# Patient Record
(demographics unavailable — no encounter records)

---

## 2024-12-16 NOTE — PHYSICAL EXAM
[Chaperone Present] : A chaperone was present in the examining room during all aspects of the physical examination [Appropriately responsive] : appropriately responsive [Alert] : alert [No Acute Distress] : no acute distress [No Lymphadenopathy] : no lymphadenopathy [Soft] : soft [Non-tender] : non-tender [Non-distended] : non-distended [No HSM] : No HSM [No Lesions] : no lesions [No Mass] : no mass [Oriented x3] : oriented x3 [Examination Of The Breasts] : a normal appearance [No Discharge] : no discharge [No Masses] : no breast masses were palpable [Labia Majora] : normal [Labia Minora] : normal [No Bleeding] : There was no active vaginal bleeding [Normal] : normal [Retroversion] : retroverted [Uterine Adnexae] : normal

## 2024-12-16 NOTE — REASON FOR VISIT
[Annual] : an annual visit. [Dysmenorrhea] : dysmenorrhea [Abnormal Uterine Bleeding] : abnormal uterine bleeding

## 2024-12-16 NOTE — COUNSELING
[Nutrition/ Exercise/ Weight Management] : nutrition, exercise, weight management [Vitamins/Supplements] : vitamins/supplements [Breast Self Exam] : breast self exam [Bladder Hygiene] : bladder hygiene [Contraception/ Emergency Contraception/ Safe Sexual Practices] : contraception, emergency contraception, safe sexual practices [STD (testing, results, tx)] : STD (testing, results, tx) [Medication Management] : medication management [No] : Not willing to quit smoking

## 2024-12-16 NOTE — HISTORY OF PRESENT ILLNESS
[Abnormal Quantity] : abnormal quantity [___ Days] : [unfilled] days [Currently Active] : currently active [No] : No [Heavy Bleeding] : heavy bleeding [FreeTextEntry1] : 25 yo  LMP 24 presents for annual c/o very heavy painful menses

## 2024-12-16 NOTE — REVIEW OF SYSTEMS
[Negative] : Heme/Lymph [Abn Vaginal bleeding] : abnormal vaginal bleeding [Pelvic pain] : pelvic pain [Genital Rash/Irritation] : genital rash/irritation

## 2025-01-26 NOTE — HISTORY OF PRESENT ILLNESS
[FreeTextEntry1] : 25 yo presents foor evaluation of contraception and vaginal discharge.  Pt currently not sexually active.  Pt c/o of vaginal discharge.

## 2025-01-26 NOTE — DISCUSSION/SUMMARY
[FreeTextEntry1] : discussed with patient HPV+.  Patient reassured that most HPV infections are transient, however some can remain active.  Pt understands the need for co-testing surveillance. I spent the time noted on the day of this patient encounter preparing for, providing and documenting the above service. I have counseled and educated the patient on the differential, workup, disease course, and treatment/management plan. Education was provided to the patient during this encounter. All questions and concerns were answered and addressed in detail.

## 2025-01-26 NOTE — PHYSICAL EXAM
[Chaperone Present] : A chaperone was present in the examining room during all aspects of the physical examination [Soft] : soft [Non-tender] : non-tender [Non-distended] : non-distended [Oriented x3] : oriented x3 [Labia Majora] : normal [Labia Minora] : normal [Normal] : normal [Uterine Adnexae] : normal

## 2025-03-05 NOTE — ASSESSMENT
[FreeTextEntry1] : This is a case of a 26 yr right handed female with PMH of substance abuse, ADHD, presents today with headaches.  will avoid triptans for pt substance abuse history and psy history  Plan:     {      } trial ubrelvy 100 mg     {      } MRI brain- worsening ha    {      } start mag 400 mg and b2 400 mg    {      } continue working with Psy and therapist    {      } F/u 3 months   Headache education provided: [] Stay well hydrated [] Limit excessive caffeine and alcohol intake [] Maintain good sleep hygiene [] Try to avoid triggers [] Practice good eating habits [] Avoid excessive use of over the counter pain medications, as they can cause medication overuse headaches  [] Keep a headache diary [] Relaxation techniques, biofeedback, massage therapy, acupunctures, and heating pads may be effective  The above plan was discussed with Cleo Oseguera in great detail. Cleo Oseguera verbalized understanding and agrees with plan as detailed above. Patient was provided education and counselling on current diagnosis/symptoms. She was advised to call our clinic at [phone] for any new or worsening symptoms, or with any questions or concerns. In case of acute onset of neurological symptoms or worsening presentation, patient was advised to present to nearest emergency room for further evaluation. Cleo Oseguera expressed understanding and all her questions/concerns were addressed.
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
## 2025-03-20 NOTE — COUNSELING
[Vitamins/Supplements] : vitamins/supplements [Bladder Hygiene] : bladder hygiene [Contraception/ Emergency Contraception/ Safe Sexual Practices] : contraception, emergency contraception, safe sexual practices [STD (testing, results, tx)] : STD (testing, results, tx)

## 2025-03-20 NOTE — HISTORY OF PRESENT ILLNESS
[Currently Active] : currently active [Yes] : Yes [No] : No [FreeTextEntry1] : 25 yo presents for evaluation of pelvic pressure, urinary discomfort, vaginal irritation.  Pt reports taking plan B.  Sexually active with new partner

## 2025-03-20 NOTE — PHYSICAL EXAM
[Appropriately responsive] : appropriately responsive [Alert] : alert [Soft] : soft [Non-tender] : non-tender [Non-distended] : non-distended [Oriented x3] : oriented x3 [Labia Majora] : normal [Labia Minora] : normal [Discharge] : a  ~M vaginal discharge was present [Scant] : scant [White] : white [No Bleeding] : There was no active vaginal bleeding [Normal] : normal [Uterine Adnexae] : normal